# Patient Record
Sex: MALE | URBAN - METROPOLITAN AREA
[De-identification: names, ages, dates, MRNs, and addresses within clinical notes are randomized per-mention and may not be internally consistent; named-entity substitution may affect disease eponyms.]

---

## 2019-11-21 ENCOUNTER — APPOINTMENT (RX ONLY)
Dept: URBAN - METROPOLITAN AREA CLINIC 42 | Facility: CLINIC | Age: 74
Setting detail: DERMATOLOGY
End: 2019-11-21

## 2019-11-21 DIAGNOSIS — L29.89 OTHER PRURITUS: ICD-10-CM

## 2019-11-21 DIAGNOSIS — L29.8 OTHER PRURITUS: ICD-10-CM

## 2019-11-21 PROBLEM — Z92.3 PERSONAL HISTORY OF IRRADIATION: Status: ACTIVE | Noted: 2019-11-21

## 2019-11-21 PROCEDURE — ? ADDITIONAL NOTES

## 2019-11-21 PROCEDURE — ? OTHER

## 2019-11-21 PROCEDURE — ? PRESCRIPTION

## 2019-11-21 PROCEDURE — ? COUNSELING

## 2019-11-21 PROCEDURE — 99214 OFFICE O/P EST MOD 30 MIN: CPT

## 2019-11-21 RX ORDER — TRIAMCINOLONE ACETONIDE 1 MG/G
CREAM TOPICAL BID
Qty: 1 | Refills: 1 | Status: ERX | COMMUNITY
Start: 2019-11-21

## 2019-11-21 RX ADMIN — TRIAMCINOLONE ACETONIDE: 1 CREAM TOPICAL at 00:00

## 2019-11-21 ASSESSMENT — LOCATION SIMPLE DESCRIPTION DERM
LOCATION SIMPLE: RIGHT UPPER ARM
LOCATION SIMPLE: CHEST
LOCATION SIMPLE: LEFT UPPER ARM

## 2019-11-21 ASSESSMENT — LOCATION ZONE DERM
LOCATION ZONE: TRUNK
LOCATION ZONE: ARM

## 2019-11-21 ASSESSMENT — LOCATION DETAILED DESCRIPTION DERM
LOCATION DETAILED: STERNUM
LOCATION DETAILED: LEFT ANTERIOR PROXIMAL UPPER ARM
LOCATION DETAILED: RIGHT ANTERIOR PROXIMAL UPPER ARM

## 2019-11-21 NOTE — PROCEDURE: MIPS QUALITY
Quality 131: Pain Assessment And Follow-Up: Pain assessment using a standardized tool is documented as negative, no follow-up plan required
Quality 111:Pneumonia Vaccination Status For Older Adults: Pneumococcal Vaccination not Administered or Previously Received, Reason not Otherwise Specified
Quality 130: Documentation Of Current Medications In The Medical Record: Current Medications Documented
Quality 110: Preventive Care And Screening: Influenza Immunization: Influenza Immunization not Administered for Documented Reasons.
Detail Level: Detailed

## 2019-11-21 NOTE — HPI: RASH
What Type Of Note Output Would You Prefer (Optional)?: Standard Output
How Severe Is Your Rash?: moderate
Is This A New Presentation, Or A Follow-Up?: Rash
Additional History: Pt recently changed medication from Coumadin to Eliquis in October 2019

## 2019-11-21 NOTE — PROCEDURE: OTHER
Other (Free Text): request recent labs from oncologist\\n\\npt had autologous stem cell transplant 9/19\\nwas on ?revlimid in past
Note Text (......Xxx Chief Complaint.): This diagnosis correlates with the
Detail Level: Zone

## 2020-10-15 ENCOUNTER — APPOINTMENT (RX ONLY)
Dept: URBAN - METROPOLITAN AREA CLINIC 42 | Facility: CLINIC | Age: 75
Setting detail: DERMATOLOGY
End: 2020-10-15

## 2020-10-15 VITALS — TEMPERATURE: 97.7 F

## 2020-10-15 DIAGNOSIS — L28.0 LICHEN SIMPLEX CHRONICUS: ICD-10-CM

## 2020-10-15 PROCEDURE — ? COUNSELING

## 2020-10-15 PROCEDURE — ? ADDITIONAL NOTES

## 2020-10-15 PROCEDURE — 99213 OFFICE O/P EST LOW 20 MIN: CPT

## 2020-10-15 ASSESSMENT — LOCATION DETAILED DESCRIPTION DERM: LOCATION DETAILED: RIGHT MEDIAL PLANTAR HEEL

## 2020-10-15 ASSESSMENT — LOCATION SIMPLE DESCRIPTION DERM: LOCATION SIMPLE: RIGHT PLANTAR SURFACE

## 2020-10-15 ASSESSMENT — LOCATION ZONE DERM: LOCATION ZONE: FEET

## 2020-10-15 NOTE — PROCEDURE: ADDITIONAL NOTES
Detail Level: Simple
Additional Notes: Eucerin roughness relief spot treatment BID\\nCan cover with Saran Wrap nightly

## 2021-11-01 ENCOUNTER — PREPPED CHART (OUTPATIENT)
Dept: URBAN - METROPOLITAN AREA CLINIC 66 | Facility: CLINIC | Age: 76
End: 2021-11-01

## 2021-11-01 PROBLEM — H35.713 CENTRAL SEROUS CHORIORETINOPATHY: Status: RESOLVED | Noted: 2021-11-01 | Resolved: 2021-11-01

## 2021-11-01 PROBLEM — H35.713 CENTRAL SEROUS CHORIORETINOPATHY: Noted: 2021-11-01

## 2021-11-01 PROBLEM — H43.813 POSTERIOR VITREOUS DETACHMENT: Noted: 2021-11-01

## 2021-11-01 PROBLEM — E11.3212 MILD NONPROLIFERATIVE DIABETIC RETINOPATHY: Status: STABILIZING | Noted: 2021-11-01 | Resolved: 2021-11-01

## 2021-11-01 PROBLEM — H04.123 DRY EYE SYNDROME: Noted: 2021-11-01

## 2021-11-01 PROBLEM — H04.123 DRY EYE SYNDROME: Noted: 2021-11-01 | Resolved: 2021-11-01

## 2021-11-01 PROBLEM — H35.372 EPIRETINAL MEMBRANE: Status: STABILIZING | Noted: 2021-11-01 | Resolved: 2021-11-01

## 2021-11-01 PROBLEM — E11.3291 MILD NONPROLIFERATIVE DIABETIC RETINOPATHY: Noted: 2021-11-01

## 2021-11-01 PROBLEM — E11.3291 MILD NONPROLIFERATIVE DIABETIC RETINOPATHY: Status: STABILIZING | Noted: 2021-11-01 | Resolved: 2021-11-01

## 2021-11-01 PROBLEM — H35.372 EPIRETINAL MEMBRANE: Noted: 2021-11-01

## 2021-11-01 PROBLEM — E11.3212 DIABETIC MACULAR EDEMA: Noted: 2021-11-01

## 2021-11-01 PROBLEM — H43.813 POSTERIOR VITREOUS DETACHMENT: Status: STABILIZING | Noted: 2021-11-01 | Resolved: 2021-11-01

## 2021-11-01 PROBLEM — E11.3212 DIABETIC MACULAR EDEMA: Status: RESOLVED | Noted: 2021-11-01 | Resolved: 2021-11-01

## 2021-11-01 PROBLEM — E11.3212 MILD NONPROLIFERATIVE DIABETIC RETINOPATHY: Noted: 2021-11-01

## 2021-11-01 PROBLEM — E11.3212 DIABETIC MACULAR EDEMA: Noted: 2021-11-01 | Resolved: 2021-11-01

## 2022-04-25 ASSESSMENT — VISUAL ACUITY
OD_CC: 20/20-2
OS_CC: 20/30

## 2022-04-25 ASSESSMENT — TONOMETRY
OS_IOP_MMHG: 13
OD_IOP_MMHG: 13

## 2022-05-05 ENCOUNTER — FOLLOW UP (OUTPATIENT)
Dept: URBAN - METROPOLITAN AREA CLINIC 66 | Facility: CLINIC | Age: 77
End: 2022-05-05

## 2022-05-05 DIAGNOSIS — E11.3291: ICD-10-CM

## 2022-05-05 DIAGNOSIS — H35.713: ICD-10-CM

## 2022-05-05 DIAGNOSIS — E11.3212: ICD-10-CM

## 2022-05-05 PROCEDURE — 92134 CPTRZ OPH DX IMG PST SGM RTA: CPT

## 2022-05-05 PROCEDURE — 92014 COMPRE OPH EXAM EST PT 1/>: CPT

## 2022-05-05 ASSESSMENT — VISUAL ACUITY
OS_CC: 20/20-1
OD_CC: 20/25-2

## 2022-05-05 ASSESSMENT — TONOMETRY
OD_IOP_MMHG: 11
OS_IOP_MMHG: 12

## 2022-09-01 ENCOUNTER — COMPLETE EYE EXAM (OUTPATIENT)
Dept: URBAN - METROPOLITAN AREA CLINIC 66 | Facility: CLINIC | Age: 77
End: 2022-09-01

## 2022-09-01 DIAGNOSIS — H35.372: ICD-10-CM

## 2022-09-01 DIAGNOSIS — H35.713: ICD-10-CM

## 2022-09-01 DIAGNOSIS — E11.3291: ICD-10-CM

## 2022-09-01 DIAGNOSIS — H43.813: ICD-10-CM

## 2022-09-01 DIAGNOSIS — E11.3212: ICD-10-CM

## 2022-09-01 PROCEDURE — 92014 COMPRE OPH EXAM EST PT 1/>: CPT

## 2022-09-01 PROCEDURE — 92134 CPTRZ OPH DX IMG PST SGM RTA: CPT

## 2022-09-01 ASSESSMENT — TONOMETRY
OD_IOP_MMHG: 16
OS_IOP_MMHG: 13

## 2022-09-01 ASSESSMENT — VISUAL ACUITY
OS_CC: 20/30+2
OS_PH: 20/25
OD_CC: 20/20-2

## 2023-09-25 ENCOUNTER — COMPLETE EYE EXAM (OUTPATIENT)
Dept: URBAN - METROPOLITAN AREA CLINIC 66 | Facility: CLINIC | Age: 78
End: 2023-09-25

## 2023-09-25 DIAGNOSIS — E11.3212: ICD-10-CM

## 2023-09-25 DIAGNOSIS — H35.713: ICD-10-CM

## 2023-09-25 DIAGNOSIS — H43.813: ICD-10-CM

## 2023-09-25 DIAGNOSIS — H35.372: ICD-10-CM

## 2023-09-25 DIAGNOSIS — E11.3291: ICD-10-CM

## 2023-09-25 PROCEDURE — 92202 OPSCPY EXTND ON/MAC DRAW: CPT

## 2023-09-25 PROCEDURE — 92014 COMPRE OPH EXAM EST PT 1/>: CPT

## 2023-09-25 PROCEDURE — 92134 CPTRZ OPH DX IMG PST SGM RTA: CPT

## 2023-09-25 ASSESSMENT — VISUAL ACUITY
OD_CC: 20/30+1
OS_CC: 20/25-2